# Patient Record
Sex: FEMALE | Race: WHITE | HISPANIC OR LATINO
[De-identification: names, ages, dates, MRNs, and addresses within clinical notes are randomized per-mention and may not be internally consistent; named-entity substitution may affect disease eponyms.]

---

## 2017-07-19 ENCOUNTER — RESULT REVIEW (OUTPATIENT)
Age: 72
End: 2017-07-19

## 2018-02-28 ENCOUNTER — OUTPATIENT (OUTPATIENT)
Dept: OUTPATIENT SERVICES | Facility: HOSPITAL | Age: 73
LOS: 1 days | End: 2018-02-28
Payer: MEDICAID

## 2018-02-28 VITALS
HEART RATE: 67 BPM | OXYGEN SATURATION: 97 % | WEIGHT: 160.06 LBS | SYSTOLIC BLOOD PRESSURE: 165 MMHG | HEIGHT: 63 IN | TEMPERATURE: 98 F | RESPIRATION RATE: 16 BRPM | DIASTOLIC BLOOD PRESSURE: 68 MMHG

## 2018-02-28 DIAGNOSIS — Z96.651 PRESENCE OF RIGHT ARTIFICIAL KNEE JOINT: Chronic | ICD-10-CM

## 2018-02-28 DIAGNOSIS — Z01.818 ENCOUNTER FOR OTHER PREPROCEDURAL EXAMINATION: ICD-10-CM

## 2018-02-28 DIAGNOSIS — Z98.890 OTHER SPECIFIED POSTPROCEDURAL STATES: Chronic | ICD-10-CM

## 2018-02-28 DIAGNOSIS — M20.12 HALLUX VALGUS (ACQUIRED), LEFT FOOT: ICD-10-CM

## 2018-02-28 DIAGNOSIS — N81.10 CYSTOCELE, UNSPECIFIED: Chronic | ICD-10-CM

## 2018-02-28 DIAGNOSIS — M20.42 OTHER HAMMER TOE(S) (ACQUIRED), LEFT FOOT: ICD-10-CM

## 2018-02-28 PROCEDURE — 73630 X-RAY EXAM OF FOOT: CPT | Mod: 26,LT

## 2018-02-28 RX ORDER — SODIUM CHLORIDE 9 MG/ML
3 INJECTION INTRAMUSCULAR; INTRAVENOUS; SUBCUTANEOUS EVERY 8 HOURS
Qty: 0 | Refills: 0 | Status: DISCONTINUED | OUTPATIENT
Start: 2018-03-05 | End: 2018-03-13

## 2018-02-28 NOTE — H&P PST ADULT - NEGATIVE GASTROINTESTINAL SYMPTOMS
no diarrhea/no nausea/h/o gerd/no constipation/no change in bowel habits/no abdominal pain/no vomiting

## 2018-02-28 NOTE — H&P PST ADULT - PSH
History of ovarian cystectomy  left - 2015  Status post total right knee replacement  11/15/2016  Vaginal prolapse  s/p surgical repair 11/2015

## 2018-02-28 NOTE — H&P PST ADULT - NEGATIVE CARDIOVASCULAR SYMPTOMS
no dyspnea on exertion/no orthopnea/no chest pain/no paroxysmal nocturnal dyspnea/no peripheral edema

## 2018-02-28 NOTE — H&P PST ADULT - NEGATIVE GENERAL SYMPTOMS
no fever/no sweating/no fatigue/no chills/no anorexia/no weight loss/no polyphagia/no malaise/no weight gain/no polyuria/no polydipsia

## 2018-02-28 NOTE — H&P PST ADULT - PMH
GERD without esophagitis    Hallux valgus (acquired), left foot    Old MI (myocardial infarction)    Other acquired hammer toe of left foot GERD without esophagitis    Hallux valgus (acquired), left foot    Migraine    Old MI (myocardial infarction)    Other acquired hammer toe of left foot

## 2018-02-28 NOTE — H&P PST ADULT - RS GEN PE MLT RESP DETAILS PC
no intercostal retractions/normal/no rales/no wheezes/airway patent/breath sounds equal/respirations non-labored/clear to auscultation bilaterally/good air movement/no chest wall tenderness/no rhonchi/no subcutaneous emphysema

## 2018-02-28 NOTE — H&P PST ADULT - ASSESSMENT
71 y/o female with PMH of old MI (2015), GERD, colitis in remission now diagnosed with hallux valgus left foot and hammer toes, left foot. Patient is scheduled for left foot kendrick bunionectomy and 2nd digit arthroplasty on 3/5/2018. She is at low risk for planned procedure

## 2018-02-28 NOTE — H&P PST ADULT - MS GEN HX ROS MEA POS PC
right knee, left great toe pain/muscle cramps/arthritis right knee, left great toe pain, cramps in digits of left foot/muscle cramps/arthritis

## 2018-02-28 NOTE — H&P PST ADULT - HISTORY OF PRESENT ILLNESS
73 y/o female with PMH of old MI (2015), GERD, colitis in remission is here today for presurgical evaluation. Complains of left leg muscle cramps and left foot pain aggravated by walking. She was diagnosed with hallux valgus left foot and hammer toes, left foot. Patient is scheduled for left foot kendrick bunionectomy and 2nd digit arthroplasty on 3/5/2018

## 2018-02-28 NOTE — H&P PST ADULT - PROBLEM SELECTOR PLAN 1
Scheduled for left foot kendrick bunionectomy on 3/5/2018 Scheduled for left foot Dean bunionectomy on 3/5/2018. Preoperative instructions discussed and given to patient. Verbalized understanding of instructions

## 2018-03-04 ENCOUNTER — TRANSCRIPTION ENCOUNTER (OUTPATIENT)
Age: 73
End: 2018-03-04

## 2018-03-05 ENCOUNTER — OUTPATIENT (OUTPATIENT)
Dept: OUTPATIENT SERVICES | Facility: HOSPITAL | Age: 73
LOS: 1 days | End: 2018-03-05
Payer: MEDICAID

## 2018-03-05 ENCOUNTER — RESULT REVIEW (OUTPATIENT)
Age: 73
End: 2018-03-05

## 2018-03-05 VITALS
SYSTOLIC BLOOD PRESSURE: 121 MMHG | HEART RATE: 66 BPM | TEMPERATURE: 98 F | DIASTOLIC BLOOD PRESSURE: 65 MMHG | OXYGEN SATURATION: 99 % | RESPIRATION RATE: 16 BRPM

## 2018-03-05 VITALS
WEIGHT: 160.06 LBS | HEART RATE: 81 BPM | OXYGEN SATURATION: 99 % | DIASTOLIC BLOOD PRESSURE: 66 MMHG | RESPIRATION RATE: 14 BRPM | TEMPERATURE: 98 F | SYSTOLIC BLOOD PRESSURE: 152 MMHG | HEIGHT: 63 IN

## 2018-03-05 DIAGNOSIS — Z96.651 PRESENCE OF RIGHT ARTIFICIAL KNEE JOINT: Chronic | ICD-10-CM

## 2018-03-05 DIAGNOSIS — M20.12 HALLUX VALGUS (ACQUIRED), LEFT FOOT: ICD-10-CM

## 2018-03-05 DIAGNOSIS — Z98.890 OTHER SPECIFIED POSTPROCEDURAL STATES: Chronic | ICD-10-CM

## 2018-03-05 DIAGNOSIS — M20.42 OTHER HAMMER TOE(S) (ACQUIRED), LEFT FOOT: ICD-10-CM

## 2018-03-05 DIAGNOSIS — Z01.818 ENCOUNTER FOR OTHER PREPROCEDURAL EXAMINATION: ICD-10-CM

## 2018-03-05 DIAGNOSIS — N81.10 CYSTOCELE, UNSPECIFIED: Chronic | ICD-10-CM

## 2018-03-05 PROCEDURE — 28285 REPAIR OF HAMMERTOE: CPT | Mod: LT

## 2018-03-05 PROCEDURE — 88300 SURGICAL PATH GROSS: CPT | Mod: 26

## 2018-03-05 PROCEDURE — 73630 X-RAY EXAM OF FOOT: CPT

## 2018-03-05 PROCEDURE — 88300 SURGICAL PATH GROSS: CPT

## 2018-03-05 PROCEDURE — G0463: CPT

## 2018-03-05 PROCEDURE — 28292 COR HLX VLGS RSC PRX PHLX BS: CPT | Mod: TA

## 2018-03-05 RX ORDER — HYDROMORPHONE HYDROCHLORIDE 2 MG/ML
0.5 INJECTION INTRAMUSCULAR; INTRAVENOUS; SUBCUTANEOUS
Qty: 0 | Refills: 0 | Status: ON HOLD | OUTPATIENT
Start: 2018-03-05

## 2018-03-05 RX ORDER — IBUPROFEN 200 MG
1 TABLET ORAL
Qty: 20 | Refills: 0 | OUTPATIENT
Start: 2018-03-05 | End: 2018-03-14

## 2018-03-05 RX ORDER — SODIUM CHLORIDE 9 MG/ML
1000 INJECTION, SOLUTION INTRAVENOUS
Qty: 0 | Refills: 0 | Status: ON HOLD | OUTPATIENT
Start: 2018-03-05

## 2018-03-05 RX ORDER — OXYCODONE AND ACETAMINOPHEN 5; 325 MG/1; MG/1
1 TABLET ORAL EVERY 6 HOURS
Qty: 0 | Refills: 0 | Status: DISCONTINUED | OUTPATIENT
Start: 2018-03-05 | End: 2018-03-05

## 2018-03-05 RX ORDER — AZITHROMYCIN 500 MG/1
1 TABLET, FILM COATED ORAL
Qty: 5 | Refills: 0 | OUTPATIENT
Start: 2018-03-05 | End: 2018-03-09

## 2018-03-05 RX ADMIN — OXYCODONE AND ACETAMINOPHEN 1 TABLET(S): 5; 325 TABLET ORAL at 12:14

## 2018-03-05 RX ADMIN — OXYCODONE AND ACETAMINOPHEN 1 TABLET(S): 5; 325 TABLET ORAL at 12:44

## 2018-03-05 NOTE — ASU PATIENT PROFILE, ADULT - PMH
GERD without esophagitis    Hallux valgus (acquired), left foot    Migraine    Old MI (myocardial infarction)    Other acquired hammer toe of left foot

## 2018-03-05 NOTE — ASU DISCHARGE PLAN (ADULT/PEDIATRIC). - NOTIFY
Swelling that continues/Persistent Nausea and Vomiting/Bleeding that does not stop/Numbness, color, or temperature change to extremity Fever greater than 101/Swelling that continues/Bleeding that does not stop/Numbness, color, or temperature change to extremity/Pain not relieved by Medications/Persistent Nausea and Vomiting

## 2018-03-05 NOTE — ASU DISCHARGE PLAN (ADULT/PEDIATRIC). - MEDICATION SUMMARY - MEDICATIONS TO TAKE
I will START or STAY ON the medications listed below when I get home from the hospital:    Percocet 5/325 oral tablet  -- 1 tab(s) by mouth 2 times a day MDD:max 2  -- Caution federal law prohibits the transfer of this drug to any person other  than the person for whom it was prescribed.  May cause drowsiness.  Alcohol may intensify this effect.  Use care when operating dangerous machinery.  This prescription cannot be refilled.  This product contains acetaminophen.  Do not use  with any other product containing acetaminophen to prevent possible liver damage.  Using more of this medication than prescribed may cause serious breathing problems.    -- Indication: For Acquired hallux valgus of left foot    ibuprofen 400 mg oral tablet  -- 1 tab(s) by mouth 2 times a day   -- Do not take this drug if you are pregnant.  It is very important that you take or use this exactly as directed.  Do not skip doses or discontinue unless directed by your doctor.  May cause drowsiness or dizziness.  Obtain medical advice before taking any non-prescription drugs as some may affect the action of this medication.  Take with food or milk.    -- Indication: For Acquired hallux valgus of left foot    Eliquis 5 mg oral tablet  -- 1 tab(s) by mouth 2 times a day  -- Indication: For Acquired hallux valgus of left foot    metoprolol succinate 25 mg oral tablet, extended release  -- 1 tab(s) by mouth once a day  -- Indication: For Other acquired hammer toe of left foot    Azithromycin 5 Day Dose Pack 250 mg oral tablet  -- 1  by mouth once a day   -- Do not take dairy products, antacids, or iron preparations within one hour of this medication.  Finish all this medication unless otherwise directed by prescriber.    -- Indication: For Acquired hallux valgus of left foot

## 2018-03-05 NOTE — BRIEF OPERATIVE NOTE - PROCEDURE
<<-----Click on this checkbox to enter Procedure Dean bunionectomy with correction of hammer toe  03/05/2018    Active  RGROVER

## 2018-03-06 PROCEDURE — 73630 X-RAY EXAM OF FOOT: CPT | Mod: 26,LT

## 2018-03-07 LAB — SURGICAL PATHOLOGY FINAL REPORT - CH: SIGNIFICANT CHANGE UP

## 2018-08-03 PROBLEM — K21.9 GASTRO-ESOPHAGEAL REFLUX DISEASE WITHOUT ESOPHAGITIS: Chronic | Status: ACTIVE | Noted: 2018-02-28

## 2018-08-03 PROBLEM — G43.909 MIGRAINE, UNSPECIFIED, NOT INTRACTABLE, WITHOUT STATUS MIGRAINOSUS: Chronic | Status: ACTIVE | Noted: 2018-02-28

## 2018-08-03 PROBLEM — M20.42 OTHER HAMMER TOE(S) (ACQUIRED), LEFT FOOT: Chronic | Status: ACTIVE | Noted: 2018-02-28

## 2018-08-03 PROBLEM — M20.12 HALLUX VALGUS (ACQUIRED), LEFT FOOT: Chronic | Status: ACTIVE | Noted: 2018-02-28

## 2018-08-03 PROBLEM — I25.2 OLD MYOCARDIAL INFARCTION: Chronic | Status: ACTIVE | Noted: 2018-02-28

## 2018-08-14 ENCOUNTER — OUTPATIENT (OUTPATIENT)
Dept: OUTPATIENT SERVICES | Facility: HOSPITAL | Age: 73
LOS: 1 days | End: 2018-08-14
Payer: MEDICAID

## 2018-08-14 VITALS
HEART RATE: 68 BPM | TEMPERATURE: 98 F | OXYGEN SATURATION: 98 % | WEIGHT: 149.91 LBS | DIASTOLIC BLOOD PRESSURE: 73 MMHG | RESPIRATION RATE: 18 BRPM | SYSTOLIC BLOOD PRESSURE: 144 MMHG | HEIGHT: 62 IN

## 2018-08-14 DIAGNOSIS — I48.0 PAROXYSMAL ATRIAL FIBRILLATION: ICD-10-CM

## 2018-08-14 DIAGNOSIS — M20.41 OTHER HAMMER TOE(S) (ACQUIRED), RIGHT FOOT: ICD-10-CM

## 2018-08-14 DIAGNOSIS — Z01.818 ENCOUNTER FOR OTHER PREPROCEDURAL EXAMINATION: ICD-10-CM

## 2018-08-14 DIAGNOSIS — N81.10 CYSTOCELE, UNSPECIFIED: Chronic | ICD-10-CM

## 2018-08-14 DIAGNOSIS — M20.11 HALLUX VALGUS (ACQUIRED), RIGHT FOOT: ICD-10-CM

## 2018-08-14 DIAGNOSIS — M20.12 HALLUX VALGUS (ACQUIRED), LEFT FOOT: Chronic | ICD-10-CM

## 2018-08-14 DIAGNOSIS — Z96.651 PRESENCE OF RIGHT ARTIFICIAL KNEE JOINT: Chronic | ICD-10-CM

## 2018-08-14 DIAGNOSIS — Z98.890 OTHER SPECIFIED POSTPROCEDURAL STATES: Chronic | ICD-10-CM

## 2018-08-14 PROCEDURE — 73630 X-RAY EXAM OF FOOT: CPT | Mod: 26,RT

## 2018-08-14 PROCEDURE — G0463: CPT

## 2018-08-14 PROCEDURE — 73630 X-RAY EXAM OF FOOT: CPT

## 2018-08-14 NOTE — H&P PST ADULT - HISTORY OF PRESENT ILLNESS
72 yr old female with PMH of CAD, paroxysmal atrial fibrillation, migraine headache, basal cell carcinoma presents with c/o right foot pain and stiffness due to hunion and 2nd hammer toe. Pt for right foot Dean bunionectomy, and 2nd hammer toe correction on 8/20/2018. 72 yr old female with PMH of CAD, paroxysmal atrial fibrillation, migraine headache, basal cell carcinoma of forehead and left upper lip area presents with c/o right foot pain and stiffness due to hunion and 2nd hammer toe. Pt for right foot Dean bunionectomy, and 2nd hammer toe correction on 8/20/2018.

## 2018-08-14 NOTE — H&P PST ADULT - MUSCULOSKELETAL COMMENTS
c/o left foot pain due to bunion and 2nd hammertoe c/o right foot pain due to bunion and 2nd hammertoe

## 2018-08-14 NOTE — H&P PST ADULT - NEGATIVE GASTROINTESTINAL SYMPTOMS
no vomiting/no constipation/no abdominal pain/no diarrhea/no change in bowel habits/no flatulence/no nausea

## 2018-08-14 NOTE — H&P PST ADULT - ASSESSMENT
72 yr old female with PMH of CAD, paroxysmal atrial fibrillation, migraine headache, basal cell carcinoma presents with right hallux valgus and 2nd hammer toe. Pt for right foot Dean bunionectomy, and 2nd hammer toe correction on 8/20/2018. 72 yr old female with PMH of CAD, paroxysmal atrial fibrillation, migraine headache, basal cell carcinoma of forehead and left upper lip presents with right hallux valgus and 2nd hammer toe. Pt for right foot Dean bunionectomy, and 2nd hammer toe correction on 8/20/2018.

## 2018-08-14 NOTE — H&P PST ADULT - PMH
CAD (coronary artery disease)    GERD without esophagitis    Hallux valgus (acquired), left foot    Hallux valgus of right foot    Hammer toe of second toe of right foot    Migraine    Old MI (myocardial infarction)    Other acquired hammer toe of left foot CAD (coronary artery disease)    GERD without esophagitis    Hallux valgus (acquired), left foot    Hallux valgus of right foot    Hammer toe of second toe of right foot    Migraine    Old MI (myocardial infarction)    Other acquired hammer toe of left foot    Paroxysmal atrial fibrillation Basal cell carcinoma (BCC)  forehead, left upper lip area  CAD (coronary artery disease)    GERD without esophagitis    Hallux valgus (acquired), left foot    Hallux valgus of right foot    Hammer toe of second toe of right foot    Migraine    Old MI (myocardial infarction)    Other acquired hammer toe of left foot    Paroxysmal atrial fibrillation

## 2018-08-14 NOTE — H&P PST ADULT - NEGATIVE CARDIOVASCULAR SYMPTOMS
no peripheral edema/no palpitations/no chest pain/no orthopnea/no paroxysmal nocturnal dyspnea/no dyspnea on exertion/no claudication

## 2018-08-14 NOTE — H&P PST ADULT - FAMILY HISTORY
Sibling  Still living? No  Asthma, Age at diagnosis: Age Unknown  Family history of dementia, Age at diagnosis: Age Unknown     Father  Still living? No  Family history of heart disease, Age at diagnosis: Age Unknown

## 2018-08-14 NOTE — H&P PST ADULT - PROBLEM SELECTOR PLAN 3
Pt to see Dr Brannon on 8/16/2018 for cardiac clearance and Dr Flowers on 8/15/18 for medical clearance. Pt will stop Eliquis as per their instructions.

## 2018-08-14 NOTE — H&P PST ADULT - RS GEN PE MLT RESP DETAILS PC
normal/airway patent/breath sounds equal/good air movement/no wheezes/respirations non-labored/no rales/no subcutaneous emphysema/clear to auscultation bilaterally/no chest wall tenderness/no intercostal retractions/no rhonchi

## 2018-08-14 NOTE — H&P PST ADULT - PSH
Hallux valgus (acquired), left foot  Left foot Dean bunionectomy and Second digit arthroplasty on 5/2018  History of ovarian cystectomy  left - 2015  Status post total right knee replacement  11/15/2016  Vaginal prolapse  s/p surgical repair 11/2015

## 2018-08-19 ENCOUNTER — TRANSCRIPTION ENCOUNTER (OUTPATIENT)
Age: 73
End: 2018-08-19

## 2018-08-20 ENCOUNTER — RESULT REVIEW (OUTPATIENT)
Age: 73
End: 2018-08-20

## 2018-08-20 ENCOUNTER — OUTPATIENT (OUTPATIENT)
Dept: OUTPATIENT SERVICES | Facility: HOSPITAL | Age: 73
LOS: 1 days | End: 2018-08-20
Payer: MEDICAID

## 2018-08-20 VITALS
TEMPERATURE: 98 F | WEIGHT: 149.91 LBS | SYSTOLIC BLOOD PRESSURE: 161 MMHG | HEIGHT: 62 IN | RESPIRATION RATE: 17 BRPM | DIASTOLIC BLOOD PRESSURE: 58 MMHG | OXYGEN SATURATION: 98 % | HEART RATE: 70 BPM

## 2018-08-20 VITALS
SYSTOLIC BLOOD PRESSURE: 120 MMHG | HEART RATE: 77 BPM | TEMPERATURE: 98 F | OXYGEN SATURATION: 98 % | RESPIRATION RATE: 16 BRPM | DIASTOLIC BLOOD PRESSURE: 62 MMHG

## 2018-08-20 DIAGNOSIS — M20.41 OTHER HAMMER TOE(S) (ACQUIRED), RIGHT FOOT: ICD-10-CM

## 2018-08-20 DIAGNOSIS — N81.10 CYSTOCELE, UNSPECIFIED: Chronic | ICD-10-CM

## 2018-08-20 DIAGNOSIS — M20.11 HALLUX VALGUS (ACQUIRED), RIGHT FOOT: ICD-10-CM

## 2018-08-20 DIAGNOSIS — Z98.890 OTHER SPECIFIED POSTPROCEDURAL STATES: Chronic | ICD-10-CM

## 2018-08-20 DIAGNOSIS — Z96.651 PRESENCE OF RIGHT ARTIFICIAL KNEE JOINT: Chronic | ICD-10-CM

## 2018-08-20 DIAGNOSIS — M20.12 HALLUX VALGUS (ACQUIRED), LEFT FOOT: Chronic | ICD-10-CM

## 2018-08-20 PROCEDURE — 28285 REPAIR OF HAMMERTOE: CPT | Mod: T6

## 2018-08-20 PROCEDURE — 73630 X-RAY EXAM OF FOOT: CPT

## 2018-08-20 PROCEDURE — 28292 COR HLX VLGS RSC PRX PHLX BS: CPT | Mod: RT

## 2018-08-20 PROCEDURE — 73630 X-RAY EXAM OF FOOT: CPT | Mod: 26,RT

## 2018-08-20 RX ORDER — CEPHALEXIN 500 MG
1 CAPSULE ORAL
Qty: 7 | Refills: 0 | OUTPATIENT
Start: 2018-08-20 | End: 2018-08-26

## 2018-08-20 RX ORDER — FENTANYL CITRATE 50 UG/ML
25 INJECTION INTRAVENOUS
Qty: 0 | Refills: 0 | Status: DISCONTINUED | OUTPATIENT
Start: 2018-08-20 | End: 2018-08-20

## 2018-08-20 RX ORDER — SODIUM CHLORIDE 9 MG/ML
1000 INJECTION, SOLUTION INTRAVENOUS
Qty: 0 | Refills: 0 | Status: DISCONTINUED | OUTPATIENT
Start: 2018-08-20 | End: 2018-08-20

## 2018-08-20 RX ORDER — OXYCODONE AND ACETAMINOPHEN 5; 325 MG/1; MG/1
1 TABLET ORAL ONCE
Qty: 0 | Refills: 0 | Status: DISCONTINUED | OUTPATIENT
Start: 2018-08-20 | End: 2018-08-20

## 2018-08-20 RX ADMIN — OXYCODONE AND ACETAMINOPHEN 1 TABLET(S): 5; 325 TABLET ORAL at 12:24

## 2018-08-20 NOTE — BRIEF OPERATIVE NOTE - PRE-OP DX
Gino of great toe of right foot  08/20/2018    Active  Jose Sheridan of second toe of right foot  08/20/2018    Active  Jose Sheridan

## 2018-08-20 NOTE — BRIEF OPERATIVE NOTE - PROCEDURE
<<-----Click on this checkbox to enter Procedure Arthroplasty of right second toe  08/20/2018    Active  DROBLE  Jermaine bunionectomy of right great toe  08/20/2018    Active  DROBLE

## 2018-08-20 NOTE — ASU PATIENT PROFILE, ADULT - PMH
Basal cell carcinoma (BCC)  forehead, left upper lip area  CAD (coronary artery disease)    GERD without esophagitis    Hallux valgus (acquired), left foot    Hallux valgus of right foot    Hammer toe of second toe of right foot    Migraine    Old MI (myocardial infarction)    Other acquired hammer toe of left foot    Paroxysmal atrial fibrillation

## 2018-08-20 NOTE — ASU DISCHARGE PLAN (ADULT/PEDIATRIC). - ACTIVITY LEVEL
no exercise/no sports/gym/no heavy lifting/no weight bearing no exercise/no sports/gym/no heavy lifting/elevate extremity

## 2018-08-20 NOTE — ASU DISCHARGE PLAN (ADULT/PEDIATRIC). - MEDICATION SUMMARY - MEDICATIONS TO TAKE
I will START or STAY ON the medications listed below when I get home from the hospital:    oxyCODONE-acetaminophen 5 mg-325 mg oral tablet  -- 1 tab(s) by mouth every 6 hours MDD:4  -- Caution federal law prohibits the transfer of this drug to any person other  than the person for whom it was prescribed.  May cause drowsiness.  Alcohol may intensify this effect.  Use care when operating dangerous machinery.  This prescription cannot be refilled.  This product contains acetaminophen.  Do not use  with any other product containing acetaminophen to prevent possible liver damage.  Using more of this medication than prescribed may cause serious breathing problems.    -- Indication: For Acquired hallux valgus of right foot    Naprosyn 500 mg oral tablet  -- 1 tab(s) by mouth every 12 hours MDD:2  -- Check with your doctor before becoming pregnant.  May cause drowsiness or dizziness.  Obtain medical advice before taking any non-prescription drugs as some may affect the action of this medication.  Take with food or milk.    -- Indication: For Acquired hallux valgus of right foot    Tylenol 325 mg oral tablet  -- 2 tab(s) by mouth every 4 hours, As Needed  -- Indication: For As per PCP    Eliquis 5 mg oral tablet  -- 1 tab(s) by mouth 2 times a day  -- Indication: For As per PCP    metoprolol succinate 25 mg oral tablet, extended release  -- 1 tab(s) by mouth once a day  -- Indication: For As per PCP    cephalexin 500 mg oral tablet  -- 1 tab(s) by mouth once a day   -- Finish all this medication unless otherwise directed by prescriber.    -- Indication: For prophylactic measure    Zantac 150 oral tablet  -- 1 tab(s) by mouth 2 times a day, As Needed  -- Indication: For As per PCP

## 2018-08-23 LAB — SURGICAL PATHOLOGY STUDY: SIGNIFICANT CHANGE UP

## 2018-11-10 NOTE — H&P PST ADULT - VASCULAR
pt from home c/o of lower back pain s/p lifting heavy objects 10 days ago pain worsening with movement unable to bend over pt is alert awake oriented x3 denies any numbness tingling down any extremities ambulatory with steady gait
Equal and normal pulses (carotid, femoral, dorsalis pedis)

## 2018-11-18 NOTE — H&P PST ADULT - RESPIRATORY AND THORAX
IV removed, VS obtained. Reviewed discharge instructions, home meds, and follow up care with patient, verbalized understanding.       Derek Hillman RN  11/18/18 [id number]
details…

## 2019-02-11 ENCOUNTER — OUTPATIENT (OUTPATIENT)
Dept: OUTPATIENT SERVICES | Facility: HOSPITAL | Age: 74
LOS: 1 days | Discharge: HOME | End: 2019-02-11
Payer: MEDICAID

## 2019-02-11 DIAGNOSIS — M20.12 HALLUX VALGUS (ACQUIRED), LEFT FOOT: Chronic | ICD-10-CM

## 2019-02-11 DIAGNOSIS — Z98.890 OTHER SPECIFIED POSTPROCEDURAL STATES: Chronic | ICD-10-CM

## 2019-02-11 DIAGNOSIS — N81.10 CYSTOCELE, UNSPECIFIED: Chronic | ICD-10-CM

## 2019-02-11 DIAGNOSIS — Z96.651 PRESENCE OF RIGHT ARTIFICIAL KNEE JOINT: Chronic | ICD-10-CM

## 2019-02-11 PROBLEM — C44.91 BASAL CELL CARCINOMA OF SKIN, UNSPECIFIED: Chronic | Status: ACTIVE | Noted: 2018-08-14

## 2019-02-11 PROBLEM — M20.11 HALLUX VALGUS (ACQUIRED), RIGHT FOOT: Chronic | Status: ACTIVE | Noted: 2018-08-14

## 2019-02-11 PROBLEM — I48.0 PAROXYSMAL ATRIAL FIBRILLATION: Chronic | Status: ACTIVE | Noted: 2018-08-14

## 2019-02-11 PROBLEM — I25.10 ATHEROSCLEROTIC HEART DISEASE OF NATIVE CORONARY ARTERY WITHOUT ANGINA PECTORIS: Chronic | Status: ACTIVE | Noted: 2018-08-14

## 2019-02-11 PROBLEM — M20.41 OTHER HAMMER TOE(S) (ACQUIRED), RIGHT FOOT: Chronic | Status: ACTIVE | Noted: 2018-08-14

## 2019-02-11 LAB
ANION GAP SERPL CALC-SCNC: 11 MMO/L — SIGNIFICANT CHANGE UP (ref 7–14)
BUN SERPL-MCNC: 14 MG/DL — SIGNIFICANT CHANGE UP (ref 7–23)
CALCIUM SERPL-MCNC: 9.3 MG/DL — SIGNIFICANT CHANGE UP (ref 8.4–10.5)
CHLORIDE SERPL-SCNC: 103 MMOL/L — SIGNIFICANT CHANGE UP (ref 98–107)
CO2 SERPL-SCNC: 26 MMOL/L — SIGNIFICANT CHANGE UP (ref 22–31)
CREAT SERPL-MCNC: 0.81 MG/DL — SIGNIFICANT CHANGE UP (ref 0.5–1.3)
GLUCOSE SERPL-MCNC: 102 MG/DL — HIGH (ref 70–99)
HCT VFR BLD CALC: 41.7 % — SIGNIFICANT CHANGE UP (ref 34.5–45)
HGB BLD-MCNC: 13.3 G/DL — SIGNIFICANT CHANGE UP (ref 11.5–15.5)
MCHC RBC-ENTMCNC: 27 PG — SIGNIFICANT CHANGE UP (ref 27–34)
MCHC RBC-ENTMCNC: 31.9 % — LOW (ref 32–36)
MCV RBC AUTO: 84.6 FL — SIGNIFICANT CHANGE UP (ref 80–100)
NRBC # FLD: 0 K/UL — LOW (ref 25–125)
PLATELET # BLD AUTO: 342 K/UL — SIGNIFICANT CHANGE UP (ref 150–400)
PMV BLD: 10.7 FL — SIGNIFICANT CHANGE UP (ref 7–13)
POTASSIUM SERPL-MCNC: 3.9 MMOL/L — SIGNIFICANT CHANGE UP (ref 3.5–5.3)
POTASSIUM SERPL-SCNC: 3.9 MMOL/L — SIGNIFICANT CHANGE UP (ref 3.5–5.3)
RBC # BLD: 4.93 M/UL — SIGNIFICANT CHANGE UP (ref 3.8–5.2)
RBC # FLD: 13.9 % — SIGNIFICANT CHANGE UP (ref 10.3–14.5)
SODIUM SERPL-SCNC: 140 MMOL/L — SIGNIFICANT CHANGE UP (ref 135–145)
WBC # BLD: 7.62 K/UL — SIGNIFICANT CHANGE UP (ref 3.8–10.5)
WBC # FLD AUTO: 7.62 K/UL — SIGNIFICANT CHANGE UP (ref 3.8–10.5)

## 2019-02-11 PROCEDURE — 93010 ELECTROCARDIOGRAM REPORT: CPT

## 2019-02-11 RX ORDER — RANITIDINE HYDROCHLORIDE 150 MG/1
1 TABLET, FILM COATED ORAL
Qty: 0 | Refills: 0 | COMMUNITY

## 2019-02-11 RX ORDER — ACETAMINOPHEN 500 MG
2 TABLET ORAL
Qty: 0 | Refills: 0 | COMMUNITY

## 2019-02-11 RX ORDER — METOPROLOL TARTRATE 50 MG
1 TABLET ORAL
Qty: 0 | Refills: 0 | COMMUNITY

## 2019-02-11 RX ORDER — SODIUM CHLORIDE 9 MG/ML
3 INJECTION INTRAMUSCULAR; INTRAVENOUS; SUBCUTANEOUS EVERY 8 HOURS
Qty: 0 | Refills: 0 | Status: DISCONTINUED | OUTPATIENT
Start: 2019-02-11 | End: 2019-02-26

## 2019-02-11 RX ORDER — APIXABAN 2.5 MG/1
1 TABLET, FILM COATED ORAL
Qty: 0 | Refills: 0 | COMMUNITY

## 2019-02-11 NOTE — H&P CARDIOLOGY - RS GEN PE MLT RESP DETAILS PC
respirations non-labored/clear to auscultation bilaterally/no chest wall tenderness/good air movement/airway patent/breath sounds equal

## 2019-02-11 NOTE — H&P CARDIOLOGY - HISTORY OF PRESENT ILLNESS
74 y/o F w/ PMH of AFib on Eliquis presents for cardiac catheretization. 72 y/o F w/ PMH of AFib on Eliquis presents for cardiac catheretization. Pt admits to having shortness of breath for the past few months which occurs randomly. Pt was sent for a stress test which was abnormal(results not provided). Pt denies N/V/D, fevers, chills, cough, palpitations, chest pain, syncope, orthopnea, nocturnal paroxysmal dyspnea, edema, cyanosis, hypertension, heart murmurs, varicosities, phlebitis, claudication.

## 2019-04-22 NOTE — H&P PST ADULT - VISION (WITH CORRECTIVE LENSES IF THE PATIENT USUALLY WEARS THEM):
160 Partially impaired: cannot see medication labels or newsprint, but can see obstacles in path, and the surrounding layout; can count fingers at arm's length

## 2021-02-12 NOTE — H&P PST ADULT - PAIN CHRONIC, PROFILE
Patient had small light green emesis and started complaining of abdominal cramping. Writer assisted her back to bed. Patient started shaking and not responding to writer. Nystagmus noted. Rapid response called. Rapid response team arrived. Vitals taken, BG 71, Oxygen 3L, 1mg ativan IV one time. MD switched fluids and ordered PRN ativan. Patient stable.    yes

## 2021-06-01 ENCOUNTER — APPOINTMENT (OUTPATIENT)
Dept: NEUROLOGY | Facility: CLINIC | Age: 76
End: 2021-06-01
Payer: MEDICARE

## 2021-06-01 VITALS
HEIGHT: 62.2 IN | WEIGHT: 150 LBS | TEMPERATURE: 97.9 F | BODY MASS INDEX: 27.26 KG/M2 | OXYGEN SATURATION: 98 % | HEART RATE: 72 BPM | DIASTOLIC BLOOD PRESSURE: 53 MMHG | SYSTOLIC BLOOD PRESSURE: 127 MMHG

## 2021-06-01 DIAGNOSIS — C44.90 UNSPECIFIED MALIGNANT NEOPLASM OF SKIN, UNSPECIFIED: ICD-10-CM

## 2021-06-01 DIAGNOSIS — Z78.9 OTHER SPECIFIED HEALTH STATUS: ICD-10-CM

## 2021-06-01 DIAGNOSIS — E78.5 HYPERLIPIDEMIA, UNSPECIFIED: ICD-10-CM

## 2021-06-01 DIAGNOSIS — I10 ESSENTIAL (PRIMARY) HYPERTENSION: ICD-10-CM

## 2021-06-01 DIAGNOSIS — Z82.0 FAMILY HISTORY OF EPILEPSY AND OTHER DISEASES OF THE NERVOUS SYSTEM: ICD-10-CM

## 2021-06-01 PROCEDURE — 99205 OFFICE O/P NEW HI 60 MIN: CPT

## 2021-06-01 RX ORDER — MUPIROCIN 20 MG/G
2 OINTMENT TOPICAL
Qty: 22 | Refills: 0 | Status: ACTIVE | COMMUNITY
Start: 2021-04-16

## 2021-06-01 NOTE — HISTORY OF PRESENT ILLNESS
[FreeTextEntry1] : The patient is here for memory problems that started [id number].  The patient is noting that she is forgetting things.  She has forgotten things on the stove.  The daughter has taken over the bills.  She goes out by herself without getting lost.  No difficulty with ADLs.\par \par There is no problems with mood, walking or bowel/ bladder issues.  \par The patient's mother and siblings have had/ have dementia.

## 2021-06-01 NOTE — REASON FOR VISIT
[Consultation] : a consultation visit [Source: ______] : History obtained from [unfilled] [FreeTextEntry1] : memory problems

## 2021-06-01 NOTE — CONSULT LETTER
[Dear  ___] : Dear  [unfilled], [Consult Letter:] : I had the pleasure of evaluating your patient, [unfilled]. [Please see my note below.] : Please see my note below. [Consult Closing:] : Thank you very much for allowing me to participate in the care of this patient.  If you have any questions, please do not hesitate to contact me. [Sincerely,] : Sincerely, [FreeTextEntry3] : Jackie Croft MD, MPH\par

## 2021-06-01 NOTE — PHYSICAL EXAM
[General Appearance - Alert] : alert [General Appearance - In No Acute Distress] : in no acute distress [Person] : oriented to person [Place] : oriented to place [Registration Intact] : recent registration memory intact [Naming Objects] : no difficulty naming common objects [Fluency] : fluency intact [Vocabulary] : adequate range of vocabulary [Total Score ___ / 30] : the patient achieved a score of [unfilled] /30 [Date / Time ___ / 5] : date / time [unfilled] / 5 [Place ___ / 5] : place [unfilled] / 5 [Registration ___ / 3] : registration [unfilled] / 3 [Serial Sevens ___/5] : serial sevens [unfilled] / 5 [Naming 2 Objects ___ / 2] : naming two objects [unfilled] / 2 [Repeating a Sentence ___ / 1] : repeating a sentence [unfilled] / 1 [Writing a Sentence ___ / 1] : write sentence [unfilled] / 1 [3-stage Verbal Command ___ / 3] : three-stage verbal command [unfilled] / 3 [Written Command ___ / 1] : written command [unfilled] / 1 [Copy a Design ___ / 1] : copy a design [unfilled] / 1 [Recall ___ / 3] : recall [unfilled] / 3 [Cranial Nerves Optic (II)] : visual acuity intact bilaterally,  visual fields full to confrontation, pupils equal round and reactive to light [Cranial Nerves Oculomotor (III)] : extraocular motion intact [Cranial Nerves Trigeminal (V)] : facial sensation intact symmetrically [Cranial Nerves Facial (VII)] : face symmetrical [Cranial Nerves Vestibulocochlear (VIII)] : hearing was intact bilaterally [Cranial Nerves Glossopharyngeal (IX)] : tongue and palate midline [Cranial Nerves Accessory (XI - Cranial And Spinal)] : head turning and shoulder shrug symmetric [Cranial Nerves Hypoglossal (XII)] : there was no tongue deviation with protrusion [Motor Tone] : muscle tone was normal in all four extremities [Motor Strength] : muscle strength was normal in all four extremities [Involuntary Movements] : no involuntary movements were seen [Sensation Tactile Decrease] : light touch was intact [Abnormal Walk] : normal gait [Balance] : balance was intact [1+] : Ankle jerk left 1+ [Time] : disoriented to time [Short Term Intact] : short term memory impaired [Concentration Intact] : a decrease in concentrating ability was observed [Comprehension] : comprehension not intact [Coordination - Dysmetria Impaired Finger-to-Nose Bilateral] : not present [Coordination - Dysmetria Impaired Heel-to-Shin Bilateral] : not present [Plantar Reflex Right Only] : normal on the right [Plantar Reflex Left Only] : normal on the left [FreeTextEntry4] : MMSE 23/30, 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

## 2021-06-01 NOTE — ASSESSMENT
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  There is strong family history.  Will get MRI brain to r/o cva, mass and NPH and labs for reversible causes of dementia and will discuss treatment at next visit.  Patient's questions answered.

## 2021-06-02 LAB
FOLATE SERPL-MCNC: 9.1 NG/ML
T3 SERPL-MCNC: 126 NG/DL
T4 SERPL-MCNC: 8.7 UG/DL
TSH SERPL-ACNC: 0.27 UIU/ML
VIT B12 SERPL-MCNC: 330 PG/ML

## 2021-06-03 LAB — T PALLIDUM AB SER QL IA: NEGATIVE

## 2021-06-08 LAB — METHYLMALONATE SERPL-SCNC: 362 NMOL/L

## 2021-06-28 ENCOUNTER — APPOINTMENT (OUTPATIENT)
Dept: MRI IMAGING | Facility: HOSPITAL | Age: 76
End: 2021-06-28
Payer: MEDICARE

## 2021-06-28 ENCOUNTER — APPOINTMENT (OUTPATIENT)
Dept: VASCULAR SURGERY | Facility: CLINIC | Age: 76
End: 2021-06-28
Payer: MEDICARE

## 2021-06-28 ENCOUNTER — OUTPATIENT (OUTPATIENT)
Dept: OUTPATIENT SERVICES | Facility: HOSPITAL | Age: 76
LOS: 1 days | End: 2021-06-28
Payer: MEDICARE

## 2021-06-28 VITALS
HEART RATE: 71 BPM | BODY MASS INDEX: 27.6 KG/M2 | DIASTOLIC BLOOD PRESSURE: 65 MMHG | SYSTOLIC BLOOD PRESSURE: 128 MMHG | WEIGHT: 150 LBS | HEIGHT: 62 IN

## 2021-06-28 DIAGNOSIS — M20.12 HALLUX VALGUS (ACQUIRED), LEFT FOOT: Chronic | ICD-10-CM

## 2021-06-28 DIAGNOSIS — Z96.651 PRESENCE OF RIGHT ARTIFICIAL KNEE JOINT: Chronic | ICD-10-CM

## 2021-06-28 DIAGNOSIS — R41.3 OTHER AMNESIA: ICD-10-CM

## 2021-06-28 DIAGNOSIS — N81.10 CYSTOCELE, UNSPECIFIED: Chronic | ICD-10-CM

## 2021-06-28 DIAGNOSIS — Z98.890 OTHER SPECIFIED POSTPROCEDURAL STATES: Chronic | ICD-10-CM

## 2021-06-28 PROCEDURE — 99203 OFFICE O/P NEW LOW 30 MIN: CPT

## 2021-06-28 PROCEDURE — 70551 MRI BRAIN STEM W/O DYE: CPT

## 2021-06-28 PROCEDURE — 93970 EXTREMITY STUDY: CPT

## 2021-06-28 PROCEDURE — 70551 MRI BRAIN STEM W/O DYE: CPT | Mod: 26

## 2021-06-28 RX ORDER — METOPROLOL SUCCINATE 50 MG/1
50 TABLET, EXTENDED RELEASE ORAL
Qty: 90 | Refills: 0 | Status: ACTIVE | COMMUNITY
Start: 2021-03-17

## 2021-06-28 RX ORDER — APIXABAN 5 MG/1
5 TABLET, FILM COATED ORAL
Qty: 180 | Refills: 0 | Status: ACTIVE | COMMUNITY
Start: 2021-03-17

## 2021-06-28 RX ORDER — ATORVASTATIN CALCIUM 20 MG/1
20 TABLET, FILM COATED ORAL
Qty: 90 | Refills: 0 | Status: ACTIVE | COMMUNITY
Start: 2021-03-17

## 2021-06-28 RX ORDER — PNV NO.95/FERROUS FUM/FOLIC AC 28MG-0.8MG
TABLET ORAL
Refills: 0 | Status: ACTIVE | COMMUNITY

## 2021-07-13 ENCOUNTER — APPOINTMENT (OUTPATIENT)
Dept: NEUROLOGY | Facility: CLINIC | Age: 76
End: 2021-07-13
Payer: MEDICARE

## 2021-07-13 PROCEDURE — 99214 OFFICE O/P EST MOD 30 MIN: CPT | Mod: 95

## 2021-07-13 NOTE — DATA REVIEWED
[de-identified] : Mild white matter ischemic disease [de-identified] : MRI Brain Findings: Mild white matter ischemic disease \par B12, folate methylmalonic acid, RPR and thyroid function tests unremarkable yesterday all normal

## 2021-07-13 NOTE — PHYSICAL EXAM
[General Appearance - Alert] : alert [General Appearance - In No Acute Distress] : in no acute distress [Person] : oriented to person [Concentration Intact] : normal concentrating ability [Naming Objects] : no difficulty naming common objects [Fluency] : fluency intact [Comprehension] : comprehension intact [Vocabulary] : adequate range of vocabulary [Cranial Nerves Facial (VII)] : face symmetrical

## 2021-07-13 NOTE — ASSESSMENT
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 There is strong family history. MRI of the brain and labs for reversible causes of dementia were unremarkable. Patient has cardiac arrhythmia. We will start Namenda 5 mg and titrate to 5 mg twice a day. Patient and daughter have been advised on the importance of social intellectual stimulation as well as Mediterranean diet and  minutes of aerobic exercise per week In maintaining good cognitive health.\par \par I spent the time noted on the day of this patient encounter preparing for, providing and documenting the above E/M service and counseling and educate patient on differential, workup, disease course, and treatment/management. Education was provided to the patient during this encounter. All questions and concerns were answered and addressed in detail. The patient verbalized understanding and agreed to plan. Patient was advised to continue to monitor for neurologic symptoms and to notify my office or go to the nearest emergency room if there are any changes. Any orders/referrals and communications were provided as well. \par Side effects of the above medications were discussed in detail including but not limited to applicable black box warning and teratogenicity as appropriate. \par Patient was advised to bring previous records to my office, including CD of imaging, when applicable.

## 2021-07-13 NOTE — DATA REVIEWED
[de-identified] : Mild white matter ischemic disease [de-identified] : MRI Brain Findings: Mild white matter ischemic disease \par B12, folate methylmalonic acid, RPR and thyroid function tests unremarkable yesterday all normal

## 2021-07-13 NOTE — HISTORY OF PRESENT ILLNESS
[Verbal consent obtained from patient] : the patient, [unfilled] [Home] : at home, [unfilled] , at the time of the visit. [Medical Office: ([facility])___] : at the medical office located in  [FreeTextEntry4] : [place] [FreeTextEntry1] : The patient is here for memory problems that started [id number]. The patient is noting that she is forgetting things. She has forgotten things on the stove. The daughter has taken over the bills. She goes out by herself without getting lost. No difficulty with ADLs.\par \par There is no problems with mood, walking or bowel/ bladder issues. \par The patient's mother and siblings have had/ have dementia. \par No clinical change since last visit.

## 2021-07-13 NOTE — HISTORY OF PRESENT ILLNESS
[Verbal consent obtained from patient] : the patient, [unfilled] [Home] : at home, [unfilled] , at the time of the visit. [Medical Office: ([facility])___] : at the medical office located in  [FreeTextEntry4] : [place] [FreeTextEntry1] : The patient is here for memory problems that started 8530-2257. The patient is noting that she is forgetting things. She has forgotten things on the stove. The daughter has taken over the bills. She goes out by herself without getting lost. No difficulty with ADLs.\par \par There is no problems with mood, walking or bowel/ bladder issues. \par The patient's mother and siblings have had/ have dementia. \par No clinical change since last visit.

## 2021-09-28 NOTE — ASU PREOP CHECKLIST - ALLERGY BAND ON
no known allergies Rituxan Pregnancy And Lactation Text: This medication is Pregnancy Category C and it isn't know if it is safe during pregnancy. It is unknown if this medication is excreted in breast milk but similar antibodies are known to be excreted.

## 2022-01-18 ENCOUNTER — APPOINTMENT (OUTPATIENT)
Dept: NEUROLOGY | Facility: CLINIC | Age: 77
End: 2022-01-18
Payer: MEDICARE

## 2022-01-18 PROCEDURE — 99449 NTRPROF PH1/NTRNET/EHR 31/>: CPT

## 2022-01-18 RX ORDER — MEMANTINE HYDROCHLORIDE 5 MG/1
5 TABLET, FILM COATED ORAL
Qty: 180 | Refills: 1 | Status: ACTIVE | COMMUNITY
Start: 2021-07-13 | End: 1900-01-01

## 2022-01-18 NOTE — ASSESSMENT
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 There is strong family history. MRI of the brain and labs for reversible causes of dementia were unremarkable. Patient has cardiac arrhythmia. We cw  Namenda 5 mg twice a day. Patient and daughter have been advised on the importance of social intellectual stimulation as well as Mediterranean diet and  minutes of aerobic exercise per week In maintaining good cognitive health.\par \par I spent the time noted on the day of this patient encounter preparing for, providing and documenting the above E/M service and counseling and educate patient on differential, workup, disease course, and treatment/management. Education was provided to the patient during this encounter. All questions and concerns were answered and addressed in detail. The patient verbalized understanding and agreed to plan. Patient was advised to continue to monitor for neurologic symptoms and to notify my office or go to the nearest emergency room if there are any changes. Any orders/referrals and communications were provided as well. \par Side effects of the above medications were discussed in detail including but not limited to applicable black box warning and teratogenicity as appropriate. \par Patient was advised to bring previous records to my office, including CD of imaging, when applicable.

## 2022-01-18 NOTE — HISTORY OF PRESENT ILLNESS
[Home] : at home, [unfilled] , at the time of the visit. [Medical Office: ([facility])___] : at the medical office located in  [Verbal consent obtained from patient] : the patient, [unfilled] [FreeTextEntry1] : The patient is here for memory problems that started 9416-3258. The patient is noting that she is forgetting things. She has forgotten things on the stove. The daughter has taken over the bills. She goes out by herself without getting lost. No difficulty with ADLs.\par \par There is no problems with mood, walking or bowel/ bladder issues. \par The patient's mother and siblings have had/ have dementia. \par \par Patient was started on Namenda and now on 5mg bid, doing well without side effect of the medication.\par

## 2022-04-04 ENCOUNTER — APPOINTMENT (OUTPATIENT)
Dept: SURGERY | Facility: CLINIC | Age: 77
End: 2022-04-04
Payer: MEDICARE

## 2022-04-04 VITALS
DIASTOLIC BLOOD PRESSURE: 79 MMHG | SYSTOLIC BLOOD PRESSURE: 169 MMHG | HEIGHT: 62 IN | WEIGHT: 152 LBS | BODY MASS INDEX: 27.97 KG/M2 | HEART RATE: 66 BPM

## 2022-04-04 VITALS — TEMPERATURE: 97.6 F

## 2022-04-04 DIAGNOSIS — Z78.9 OTHER SPECIFIED HEALTH STATUS: ICD-10-CM

## 2022-04-04 DIAGNOSIS — I51.9 HEART DISEASE, UNSPECIFIED: ICD-10-CM

## 2022-04-04 DIAGNOSIS — R92.8 OTHER ABNORMAL AND INCONCLUSIVE FINDINGS ON DIAGNOSTIC IMAGING OF BREAST: ICD-10-CM

## 2022-04-04 PROCEDURE — 99203 OFFICE O/P NEW LOW 30 MIN: CPT

## 2022-04-04 NOTE — ASSESSMENT
[FreeTextEntry1] : Ms. HARVEY is a 76 year y/o F who presents for breast evaluation after recent mammogram , c/w asymmetry in the central right breast 7 cm from the nipple ; subcentimeter mass in the upper outer right breast 8 to 9 cm from the nipple, possibly an intramammary lymph node; BI RADS 0 w additional diagnostic study recommended. On exam, she was found to have a small sinus /indurated tissue underneath the right breast, without evidence of pustular drainage. No masses or axillary lymphadenopathy noted.

## 2022-04-04 NOTE — CONSULT LETTER
[Dear  ___] : Dear  [unfilled], [Consult Letter:] : I had the pleasure of evaluating your patient, [unfilled]. [Consult Closing:] : Thank you very much for allowing me to participate in the care of this patient.  If you have any questions, please do not hesitate to contact me. [Sincerely,] : Sincerely, [FreeTextEntry3] : Nigel Youngblood MD\par

## 2022-04-04 NOTE — PLAN
[FreeTextEntry1] : patient was instructed to keep the area clean /dry underneath R. breast \par diagnostic mammogram and US of the right breast \par \par RTO for follow up visit after the study \par \par Patient's questions and concerns addressed to their satisfaction, and patient verbalized an understanding of the information discussed.\par

## 2022-04-04 NOTE — REVIEW OF SYSTEMS
[Fever] : no fever [Chills] : no chills [Feeling Poorly] : not feeling poorly [Chest Pain] : no chest pain [Shortness Of Breath] : no shortness of breath [Breast Pain] : no breast pain [Breast Lump] : no breast lump [Dizziness] : no dizziness [Anxiety] : no anxiety [Swollen Glands] : no swollen glands

## 2022-04-04 NOTE — DATA REVIEWED
[FreeTextEntry1] : Date of Exam: 03-\par  \par EXAM: DIGITAL BILATERAL SCREENING MAMMOGRAM WITH CAD AND TOMOSYNTHESIS\par \par HISTORY: Screening.\par \par CLINICAL BREAST EXAMINATION: The patient reports that her last clinical breast exam was within the past year. \par \par TECHNIQUE: The following views were obtained digitally: bilateral craniocaudal, bilateral mediolateral oblique. Low-dose full-field digital breast tomosynthesis examination was performed with tomosynthesis acquisitions and synthesized 2D reconstructed mammogram. Computer-aided detection (CAD) was utilized.\par \par COMPARISON: No comparison studies.\par \par FINDINGS:\par BREAST COMPOSITION: There are scattered areas of fibroglandular density.\par \par Asymmetry in the central right breast 7 cm from the nipple seen on CC view. Subcentimeter mass in the upper outer right breast 8 to 9 cm from the nipple, possibly an intramammary lymph node. Questionable prominent right axillary lymph node seen on MLO view. No suspicious mammographic findings in the left breast. \par \par IMPRESSION: Findings in the right breast described above require further evaluation with diagnostic mammography and ultrasound. Comparison with prior mammograms is strongly recommended. If prior studies are submitted an addendum will be issued.\par \par FOLLOW-UP: Additional imaging.\par \par ASSESSMENT: BI-RADS Category 0:  Incomplete. Need additional imaging evaluation.

## 2022-04-04 NOTE — PHYSICAL EXAM
[Normal Breath Sounds] : Normal breath sounds [Normal Rate and Rhythm] : normal rate and rhythm [No Rash or Lesion] : No rash or lesion [Alert] : alert [Oriented to Person] : oriented to person [Oriented to Place] : oriented to place [Oriented to Time] : oriented to time [Calm] : calm [de-identified] : A/Ox3; NAD. appears comfortable [de-identified] : EOMI; sclera anicteric. Nasal mucosa pink, septum midline. Oral mucosa pink. Tongue midline, Pharynx without exudates. [de-identified] : indurated tissue underneath right breast w draining sinus at 6:00; no palpable masses or lumps felt to either breast, no palpable lymph nodes, no nipple discharge  [de-identified] : abd is soft, NT/ND\par  [de-identified] : +ROM, no joint swelling

## 2022-04-04 NOTE — HISTORY OF PRESENT ILLNESS
[de-identified] : NAVID HARVEY is a 76 year old F who is referred to the office for consultation visit, she presents for breast exam. Patient has some indurated tissue underneath the right breast, which she states has been there for about 1 month. The area had started draining over the past week. She has a small mass to the area prior to this. No personal or family history of breast CA. Patient denies feeling any masses or lumps to either breast. No nipple discharge. \par Patient's most recent mammogram was done 03/30/22 w additional diagnostic study recommended.

## 2022-06-23 ENCOUNTER — APPOINTMENT (OUTPATIENT)
Dept: NEUROLOGY | Facility: CLINIC | Age: 77
End: 2022-06-23
Payer: MEDICARE

## 2022-06-23 VITALS
SYSTOLIC BLOOD PRESSURE: 141 MMHG | DIASTOLIC BLOOD PRESSURE: 67 MMHG | TEMPERATURE: 97.5 F | WEIGHT: 154 LBS | BODY MASS INDEX: 28.34 KG/M2 | HEIGHT: 62 IN | HEART RATE: 71 BPM | OXYGEN SATURATION: 97 %

## 2022-06-23 DIAGNOSIS — R41.3 OTHER AMNESIA: ICD-10-CM

## 2022-06-23 DIAGNOSIS — G30.9 ALZHEIMER'S DISEASE, UNSPECIFIED: ICD-10-CM

## 2022-06-23 DIAGNOSIS — F02.80 ALZHEIMER'S DISEASE, UNSPECIFIED: ICD-10-CM

## 2022-06-23 PROCEDURE — 99215 OFFICE O/P EST HI 40 MIN: CPT

## 2022-06-23 RX ORDER — MEMANTINE HYDROCHLORIDE 5 MG/1
5 TABLET, FILM COATED ORAL
Qty: 120 | Refills: 11 | Status: ACTIVE | COMMUNITY
Start: 2022-06-23 | End: 1900-01-01

## 2022-06-23 NOTE — PHYSICAL EXAM
[General Appearance - Alert] : alert [General Appearance - In No Acute Distress] : in no acute distress [Person] : oriented to person [Place] : oriented to place [Time] : oriented to time [Fluency] : fluency intact [Comprehension] : comprehension intact [Abnormal Walk] : normal gait [Balance] : balance was intact [FreeTextEntry4] : Patient is unable to cooperate with Mini-Mental status examination at this time.  She is completely tearful regarding given immigration testing in English instead of [place]ese.

## 2022-06-23 NOTE — ASSESSMENT
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 There is strong family history. MRI of the brain and labs for reversible causes of dementia were unremarkable. Patient has cardiac arrhythmia.  Will continue with Namenda, however will titrate dose to 10 mg twice a day.  Patient and daughter have been advised on the importance of social intellectual stimulation as well as Mediterranean diet and 120-150 minutes of aerobic exercise per week In maintaining good cognitive health.\par \par Will fill out immigration testing paperwork.\par \par I spent the time noted on the day of this patient encounter preparing for, providing and documenting the above E/M service and counseling and educate patient on differential, workup, disease course, and treatment/management. Education was provided to the patient during this encounter. All questions and concerns were answered and addressed in detail. The patient verbalized understanding and agreed to plan. Patient was advised to continue to monitor for neurologic symptoms and to notify my office or go to the nearest emergency room if there are any changes. Any orders/referrals and communications were provided as well. \par Side effects of the above medications were discussed in detail including but not limited to applicable black box warning and teratogenicity as appropriate. \par Patient was advised to bring previous records to my office, including CD of imaging, when applicable.

## 2022-06-23 NOTE — HISTORY OF PRESENT ILLNESS
[FreeTextEntry1] : The patient is here for memory problems that started 1251-4498. The patient is noting that she is forgetting things. She has forgotten things on the stove. The daughter has taken over the bills. She goes out by herself without getting lost. No difficulty with ADLs.\par \par There is no problems with mood, walking or bowel/ bladder issues. \par The patient's mother and siblings have had/ have dementia. \par \par Patient was started on Namenda and now on 5mg bid, doing well without side effect of the medication.  Memory has gotten worse since last visit.\par \par \par

## 2022-06-30 ENCOUNTER — APPOINTMENT (OUTPATIENT)
Dept: SURGERY | Facility: CLINIC | Age: 77
End: 2022-06-30

## 2022-06-30 VITALS — TEMPERATURE: 97.3 F

## 2022-06-30 VITALS
WEIGHT: 154 LBS | HEIGHT: 62 IN | BODY MASS INDEX: 28.34 KG/M2 | HEART RATE: 71 BPM | DIASTOLIC BLOOD PRESSURE: 78 MMHG | SYSTOLIC BLOOD PRESSURE: 139 MMHG

## 2022-06-30 PROCEDURE — 99213 OFFICE O/P EST LOW 20 MIN: CPT

## 2022-06-30 NOTE — PHYSICAL EXAM
[No Rash or Lesion] : No rash or lesion [Alert] : alert [Oriented to Person] : oriented to person [Oriented to Place] : oriented to place [Oriented to Time] : oriented to time [Calm] : calm [de-identified] : A/Ox3; NAD. appears comfortable [de-identified] : EOMI; sclera anicteric. [de-identified] : no cervical lymphadenopathy  [de-identified] : airway patent, no use of accessory muscles [de-identified] : draining sinus, underneath right breast, at 6:00; no palpable masses or lumps felt to either side, no nipple discharge, no axillary lymphadenopathy  [de-identified] : +ROM, normal gait  [de-identified] : abd is soft, NT/ND\par

## 2022-06-30 NOTE — ASSESSMENT
[FreeTextEntry1] : Ms. HARVEY  is presenting today for an evaluation. Patient has a draining sinus, underneath right breast, w persistent drainage and bleeding. Results of her recent imaging and physical examination findings were discussed in detail. She was informed of significance of findings. All of the options, risks and benefits were explained.

## 2022-06-30 NOTE — DATA REVIEWED
[FreeTextEntry1] : 	\par Patient: NAVID HARVEY\par YOB: 1945\par Date of Exam: 04-\par  \par EXAM: DIGITAL UNILATERAL RIGHT DIAGNOSTIC CALLBACK MAMMOGRAM AND TOMOSYNTHESIS AND BREAST ULTRASOUND\par \par HISTORY: The patient is seen for right breast asymmetry, right breast nodule and a right axillary lymph node at screening mammography. \par Age: 76 years old. \par \par COMPARISON: No comparison studies.\par \par MAMMOGRAM:\par \par TECHNIQUE: Full-field digital mammography of the right breast was obtained. Additional imaging is composed of orthogonal spot compression views. Low-dose full-field digital breast tomosynthesis examination was performed with tomosynthesis acquisitions and synthesized 2D reconstructed mammogram. Computer-aided detection (CAD) was utilized. \par \par FINDINGS:\par BREAST COMPOSITION: There are scattered areas of fibroglandular density. \par \par A right breast central asymmetry seen at screening mammography is effaced on additional mammographic views.\par \par A right upper outer quadrant nodule seen at screening mammography persists on additional mammographic views as an oval circumscribed nodule with a notched fat-containing border consistent with an intramammary lymph node at 9:00 9 cm from the nipple. \par \par A right breast superior posterior nodule overlying the axillary tail appears on additional mammographic views as a 0.8 cm circumscribed oval nodule at 9-10 o'clock 10 cm from the nipple. \par \par BREAST ULTRASOUND: \par \par TECHNIQUE: Complete bilateral breast ultrasound, with evaluation of the four quadrants, retroareolar regions and axillae, was performed.  \par \par FINDINGS: \par \par \par Right breast\par \par At 9:00 8 cm from the nipple, there is a 0.6 x 0.5 x 0.7 cm intramammary lymph node corresponding to a nodule visualized at mammogram.\par \par At 10:00 10 cm from the nipple , there is a 0.7 x 0.7 x 0.7 cm hypoechoic nodule with central echogenicity and hypervascularity consistent with an lymph node corresponding to a nodule visualized at mammogram.\par \par IMPRESSION:\par 1. Right breast asymmetry seen at screening mammography was a summation shadow artifact, caused by confluence of fibroglandular tissue.\par 2. Right breast upper outer quadrant nodule and right breast axillary tail nodule seen at screening mammography correspond to benign-appearing lymph nodes.  \par \par FOLLOW-UP: Annual screening. \par \par ASSESSMENT: BI-RADS Category 2:  Benign.

## 2022-06-30 NOTE — HISTORY OF PRESENT ILLNESS
[de-identified] : Ms. NAVID HARVEY is a 76 year y.o F, on Eliquis; who presents to the office for breast evaluation. Patient states she recently had a breast sonogram and mammogram, 04/13/22, without evidence of suspicious findings. Patient without personal or family history of breast CA. She denies feeling any masses or lumps to either breast. She has some tenderness to the right breast. Previously, she had been noted to have a draining sinus to the area. Patient admits to having some drainage and bleeding, at times. Patient reports that she had surgical excision of lesion in the past, approx 7 years ago. \par She also has skin CA, and is planning to go for treatment.

## 2022-06-30 NOTE — PLAN
[FreeTextEntry1] : discussed the option of surgical excision of right breast draining sinus /right inframammary fold , she had surgery to the area 7 years ago, with hx of skin CA\par all of the options, risks and benefits were explained ; the risk of recurrence was discussed \par also informed the patient she will need to D/C Eliquis prior to the procedure\par \par patient wants to have it done in the office, she will follow up for the procedure and call back for an appointment \par \par Patient's questions and concerns addressed to their satisfaction, and patient verbalized an understanding of the information discussed.\par

## 2022-10-20 ENCOUNTER — RX RENEWAL (OUTPATIENT)
Age: 77
End: 2022-10-20

## 2022-10-20 RX ORDER — MEMANTINE HYDROCHLORIDE 10 MG/1
10 TABLET, FILM COATED ORAL
Qty: 60 | Refills: 5 | Status: ACTIVE | COMMUNITY
Start: 2022-10-20 | End: 1900-01-01

## 2022-11-15 NOTE — H&P PST ADULT - SURGICAL SITE INCISION
ASSESSMENT AND PLAN :    Essential hypertension  - lisinopril (ZESTRIL) 20 MG tablet; Take 1 tablet by mouth daily. Due for labs - checking kidney function (BMP). Order on file.  Dispense: 30 tablet; Refill: 0  - BASIC METABOLIC PANEL; Future      Done     no

## 2022-12-12 NOTE — ASU DISCHARGE PLAN (ADULT/PEDIATRIC). - PATIENT BELONGING
----- Message from Willian Farmer sent at 12/12/2022  9:50 AM CST -----  Contact: Chantal/ Mother  .Type:  RX Refill Request    Who Called: Chantal./ Mother   Refill or New Rx: New Rx   RX Name and Strength:lisdexamfetamine (VYVANSE) 40 MG Cap  How is the patient currently taking it? (ex. 1XDay):as prescribed   Is this a 30 day or 90 day RX:30  Preferred Pharmacy with phone number:.  [facility]  [address]  [place] [address]  Phone: [phone] Fax: [phone]    Local or Mail Order:Local   Ordering Provider:Dr. Bishop   Would the patient rather a call back or a response via [facility]? Call   Best Call Back Number  [phone]  Additional Information:          patient's belongings returned

## 2023-04-04 NOTE — ASU PATIENT PROFILE, ADULT - TOBACCO USE
[FreeTextEntry1] : Continue balanced diet with all food groups. Brush teeth twice a day with toothbrush. Recommend visit to dentist. As per car seat 's guidelines, use foward-facing booster seat until child reaches highest weight/height for seat. Child needs to ride in a belt-positioning booster seat until  4 feet 9 inches has been reached and are between 8 and 12 years of age. Put child to sleep in own bed. Help child to maintain consistent daily routines and sleep schedule.  discussed. Ensure home is safe. Teach child about personal safety. Use consistent, positive discipline. Read aloud to child. Limit screen time to no more than 2 hours per day.\par Return 1 year for routine well child check.\par  Never smoker

## 2023-06-23 ENCOUNTER — APPOINTMENT (OUTPATIENT)
Dept: NEUROLOGY | Facility: CLINIC | Age: 78
End: 2023-06-23

## 2024-09-06 NOTE — H&P PST ADULT - AS BP NONINV METHOD
Discussed course  Doing great with no sign of recurrence  Ok to follow up in one year     electronic